# Patient Record
Sex: FEMALE | Race: WHITE | NOT HISPANIC OR LATINO
[De-identification: names, ages, dates, MRNs, and addresses within clinical notes are randomized per-mention and may not be internally consistent; named-entity substitution may affect disease eponyms.]

---

## 2021-04-05 PROBLEM — Z00.00 ENCOUNTER FOR PREVENTIVE HEALTH EXAMINATION: Status: ACTIVE | Noted: 2021-04-05

## 2021-04-06 ENCOUNTER — APPOINTMENT (OUTPATIENT)
Dept: BREAST CENTER | Facility: CLINIC | Age: 71
End: 2021-04-06
Payer: MEDICARE

## 2021-04-06 VITALS
HEART RATE: 89 BPM | BODY MASS INDEX: 33.49 KG/M2 | DIASTOLIC BLOOD PRESSURE: 81 MMHG | HEIGHT: 63 IN | WEIGHT: 189 LBS | SYSTOLIC BLOOD PRESSURE: 147 MMHG

## 2021-04-06 DIAGNOSIS — Z87.898 PERSONAL HISTORY OF OTHER SPECIFIED CONDITIONS: ICD-10-CM

## 2021-04-06 DIAGNOSIS — Z86.79 PERSONAL HISTORY OF OTHER DISEASES OF THE CIRCULATORY SYSTEM: ICD-10-CM

## 2021-04-06 DIAGNOSIS — Z80.0 FAMILY HISTORY OF MALIGNANT NEOPLASM OF DIGESTIVE ORGANS: ICD-10-CM

## 2021-04-06 DIAGNOSIS — Z12.31 ENCOUNTER FOR SCREENING MAMMOGRAM FOR MALIGNANT NEOPLASM OF BREAST: ICD-10-CM

## 2021-04-06 DIAGNOSIS — L23.9 ALLERGIC CONTACT DERMATITIS, UNSPECIFIED CAUSE: ICD-10-CM

## 2021-04-06 PROCEDURE — 99204 OFFICE O/P NEW MOD 45 MIN: CPT

## 2021-04-06 PROCEDURE — 99072 ADDL SUPL MATRL&STAF TM PHE: CPT

## 2021-04-06 RX ORDER — OLMESARTAN MEDOXOMIL-HYDROCHLOROTHIAZIDE 25; 40 MG/1; MG/1
40-25 TABLET, FILM COATED ORAL
Refills: 0 | Status: ACTIVE | COMMUNITY

## 2021-04-06 RX ORDER — ROSUVASTATIN CALCIUM 5 MG/1
5 TABLET, FILM COATED ORAL
Refills: 0 | Status: ACTIVE | COMMUNITY

## 2021-04-06 RX ORDER — BRIMONIDINE/DORZOLAMIDE/PF 0.15 %-2 %
0.15-2 DROPS OPHTHALMIC (EYE)
Refills: 0 | Status: ACTIVE | COMMUNITY

## 2021-04-06 RX ORDER — TIMOLOL MALEATE 5 MG/ML
0.5 SOLUTION OPHTHALMIC
Refills: 0 | Status: ACTIVE | COMMUNITY

## 2021-04-06 RX ORDER — BRINZOLAMIDE 10 MG/ML
1 SUSPENSION/ DROPS OPHTHALMIC
Refills: 0 | Status: ACTIVE | COMMUNITY

## 2021-04-06 NOTE — REVIEW OF SYSTEMS
[Recent Weight Gain (___ Lbs)] : recent [unfilled] ~Ulb weight gain [Eyesight Problems] : eyesight problems [Palpitations] : palpitations [Shortness Of Breath] : shortness of breath [Negative] : Heme/Lymph

## 2021-04-06 NOTE — PHYSICAL EXAM
[Normocephalic] : normocephalic [Atraumatic] : atraumatic [Supple] : supple [No Supraclavicular Adenopathy] : no supraclavicular adenopathy [No Cervical Adenopathy] : no cervical adenopathy [No Thyromegaly] : no thyromegaly [Normal Sinus Rhythm] : normal sinus rhythm [Examined in the supine and seated position] : examined in the supine and seated position [No dominant masses] : no dominant masses in right breast  [No dominant masses] : no dominant masses left breast [No Nipple Retraction] : no left nipple retraction [No Nipple Discharge] : no left nipple discharge [No Axillary Lymphadenopathy] : no left axillary lymphadenopathy [No Edema] : no edema [No Rashes] : no rashes [No Ulceration] : no ulceration [de-identified] : No palp abnl's

## 2021-04-06 NOTE — HISTORY OF PRESENT ILLNESS
[FreeTextEntry1] : Pt w/ R RA nodule detected on Scr Mammo (1/19/21)\par Mammo (1/19/21): +fatty, +poss R A nodule > add'l views\par R sx'ic Mammo/R targeted Sono (1/28/21): +4mm echogenic nodule R 12:00 RA poss present unchanged from 2015 > Rad'st rec's R F/U dx'ic Mammo/R targeted Sono (7/21)\par +FH Br Ca (M. FC 60's)\par No prior Breast Surgery, Breast Procedures or Nipple Discharge. \par No FH Ovarian, Pancreatic Cancer or Melanoma. \par \par \par

## 2021-11-02 ENCOUNTER — NON-APPOINTMENT (OUTPATIENT)
Age: 71
End: 2021-11-02

## 2021-11-02 ENCOUNTER — APPOINTMENT (OUTPATIENT)
Dept: BREAST CENTER | Facility: CLINIC | Age: 71
End: 2021-11-02
Payer: MEDICARE

## 2021-11-02 VITALS
SYSTOLIC BLOOD PRESSURE: 159 MMHG | BODY MASS INDEX: 34.91 KG/M2 | HEART RATE: 80 BPM | WEIGHT: 197 LBS | DIASTOLIC BLOOD PRESSURE: 103 MMHG | HEIGHT: 63 IN

## 2021-11-02 DIAGNOSIS — Z86.19 PERSONAL HISTORY OF OTHER INFECTIOUS AND PARASITIC DISEASES: ICD-10-CM

## 2021-11-02 DIAGNOSIS — Z78.9 OTHER SPECIFIED HEALTH STATUS: ICD-10-CM

## 2021-11-02 DIAGNOSIS — Z86.39 PERSONAL HISTORY OF OTHER ENDOCRINE, NUTRITIONAL AND METABOLIC DISEASE: ICD-10-CM

## 2021-11-02 DIAGNOSIS — Z87.19 PERSONAL HISTORY OF OTHER DISEASES OF THE DIGESTIVE SYSTEM: ICD-10-CM

## 2021-11-02 DIAGNOSIS — Z86.010 PERSONAL HISTORY OF COLONIC POLYPS: ICD-10-CM

## 2021-11-02 PROCEDURE — 99214 OFFICE O/P EST MOD 30 MIN: CPT

## 2021-11-02 NOTE — HISTORY OF PRESENT ILLNESS
[FreeTextEntry1] : Pt w/ R RA nodule detected on Scr Mammo (1/19/21)\par Mammo (1/19/21): +fatty, +poss R A nodule > add'l views\par R sx'ic Mammo/R targeted Sono (1/28/21): +4mm echogenic nodule R 12:00 RA poss present unchanged from 2015 > Rad'st rec's R F/U dx'ic Mammo/R targeted Sono (7/21)\par +FH Br Ca (M. FC 60's)\par Got second Moderna ((5/21)(R/LUE)\par No MH/FH changes. Taking Ca/D. ROS reviewed/discussed. Last Bone Densitometry (?)\par No prior Breast Surgery, Breast Procedures or Nipple Discharge. \par No FH Ovarian, Pancreatic Cancer or Melanoma. \par R F/U Mammo/R Sono (7/29/21): fatty, No RA nodule seen, NSF\par \par \par \par

## 2021-11-02 NOTE — PHYSICAL EXAM
[Normocephalic] : normocephalic [Atraumatic] : atraumatic [Supple] : supple [No Supraclavicular Adenopathy] : no supraclavicular adenopathy [No Cervical Adenopathy] : no cervical adenopathy [No Thyromegaly] : no thyromegaly [Normal Sinus Rhythm] : normal sinus rhythm [Examined in the supine and seated position] : examined in the supine and seated position [No dominant masses] : no dominant masses in right breast  [No dominant masses] : no dominant masses left breast [No Nipple Retraction] : no left nipple retraction [No Nipple Discharge] : no left nipple discharge [No Axillary Lymphadenopathy] : no left axillary lymphadenopathy [No Edema] : no edema [No Rashes] : no rashes [No Ulceration] : no ulceration [de-identified] : +prominent R Ax tissue\par NSF

## 2022-11-03 ENCOUNTER — APPOINTMENT (OUTPATIENT)
Dept: BREAST CENTER | Facility: CLINIC | Age: 72
End: 2022-11-03

## 2022-11-03 VITALS
SYSTOLIC BLOOD PRESSURE: 144 MMHG | BODY MASS INDEX: 34.73 KG/M2 | HEIGHT: 63 IN | HEART RATE: 87 BPM | WEIGHT: 196 LBS | DIASTOLIC BLOOD PRESSURE: 101 MMHG

## 2022-11-03 DIAGNOSIS — Z80.3 FAMILY HISTORY OF MALIGNANT NEOPLASM OF BREAST: ICD-10-CM

## 2022-11-03 DIAGNOSIS — N60.19 DIFFUSE CYSTIC MASTOPATHY OF UNSPECIFIED BREAST: ICD-10-CM

## 2022-11-03 DIAGNOSIS — Q83.1 ACCESSORY BREAST: ICD-10-CM

## 2022-11-03 DIAGNOSIS — R92.8 OTHER ABNORMAL AND INCONCLUSIVE FINDINGS ON DIAGNOSTIC IMAGING OF BREAST: ICD-10-CM

## 2022-11-03 DIAGNOSIS — Z87.898 PERSONAL HISTORY OF OTHER SPECIFIED CONDITIONS: ICD-10-CM

## 2022-11-03 PROCEDURE — 99213 OFFICE O/P EST LOW 20 MIN: CPT

## 2022-11-03 RX ORDER — DORZOLAMIDE HYDROCHLORIDE TIMOLOL MALEATE 20; 5 MG/ML; MG/ML
22.3-6.8 SOLUTION/ DROPS OPHTHALMIC
Qty: 10 | Refills: 0 | Status: ACTIVE | COMMUNITY
Start: 2022-10-21

## 2022-11-03 RX ORDER — TRAVOPROST 0.04 MG/ML
0 SOLUTION OPHTHALMIC
Qty: 5 | Refills: 0 | Status: ACTIVE | COMMUNITY
Start: 2021-12-01

## 2022-11-03 NOTE — REVIEW OF SYSTEMS
[Recent Weight Gain (___ Lbs)] : recent [unfilled] ~Ulb weight gain [Shortness Of Breath] : shortness of breath [Negative] : Heme/Lymph

## 2022-11-03 NOTE — PHYSICAL EXAM
[Normocephalic] : normocephalic [Atraumatic] : atraumatic [Supple] : supple [No Supraclavicular Adenopathy] : no supraclavicular adenopathy [No Cervical Adenopathy] : no cervical adenopathy [No Thyromegaly] : no thyromegaly [Normal Sinus Rhythm] : normal sinus rhythm [Examined in the supine and seated position] : examined in the supine and seated position [No dominant masses] : no dominant masses in right breast  [No dominant masses] : no dominant masses left breast [No Nipple Retraction] : no left nipple retraction [No Nipple Discharge] : no left nipple discharge [No Axillary Lymphadenopathy] : no left axillary lymphadenopathy [No Edema] : no edema [No Rashes] : no rashes [No Ulceration] : no ulceration [de-identified] : +prominent Ax Br tissue\par NSF [de-identified] : +prominent Ax Br tissue\par NSF

## 2022-11-03 NOTE — HISTORY OF PRESENT ILLNESS
[FreeTextEntry1] : Pt w/ R RA nodule detected on Scr Mammo (1/19/21)\par Mammo (1/19/21): +fatty, +poss R A nodule > add'l views\par R sx'ic Mammo/R targeted Sono (1/28/21): +4mm echogenic nodule R 12:00 RA poss present unchanged from 2015 > Rad'st rec's R F/U dx'ic Mammo/R targeted Sono (7/21)\par +FH Br Ca (M. FC 60's)\par Got second Moderna ((5/21)(R/LUE)\par No MH/FH changes. NOT taking Ca/D > discussed. ROS reviewed/discussed. Last Bone Densitometry (?)\par No prior Breast Surgery, Breast Procedures or Nipple Discharge. \par No FH Ovarian, Pancreatic Cancer or Melanoma. \par Mammo (7/15/22): fatty, NSF\par R F/U Mammo/R Sono (7/29/21): fatty, No RA nodule seen, NSF\par \par \par \par

## 2023-11-02 ENCOUNTER — APPOINTMENT (OUTPATIENT)
Dept: BREAST CENTER | Facility: CLINIC | Age: 73
End: 2023-11-02

## 2024-02-06 ENCOUNTER — OFFICE (OUTPATIENT)
Dept: URBAN - METROPOLITAN AREA CLINIC 30 | Facility: CLINIC | Age: 74
Setting detail: OPHTHALMOLOGY
End: 2024-02-06
Payer: MEDICARE

## 2024-02-06 ENCOUNTER — RX ONLY (RX ONLY)
Age: 74
End: 2024-02-06

## 2024-02-06 DIAGNOSIS — H40.1133: ICD-10-CM

## 2024-02-06 DIAGNOSIS — H40.1423: ICD-10-CM

## 2024-02-06 DIAGNOSIS — H25.093: ICD-10-CM

## 2024-02-06 DIAGNOSIS — H47.233: ICD-10-CM

## 2024-02-06 DIAGNOSIS — H40.1233: ICD-10-CM

## 2024-02-06 PROCEDURE — 92020 GONIOSCOPY: CPT | Performed by: OPHTHALMOLOGY

## 2024-02-06 PROCEDURE — 92004 COMPRE OPH EXAM NEW PT 1/>: CPT | Performed by: OPHTHALMOLOGY

## 2024-02-06 PROCEDURE — 92250 FUNDUS PHOTOGRAPHY W/I&R: CPT | Performed by: OPHTHALMOLOGY

## 2024-02-06 ASSESSMENT — CONFRONTATIONAL VISUAL FIELD TEST (CVF)
OS_FINDINGS: FULL
OD_FINDINGS: FULL

## 2024-02-06 ASSESSMENT — REFRACTION_AUTOREFRACTION
OD_AXIS: 5
OS_CYLINDER: +1.50
OD_CYLINDER: +1.75
OS_AXIS: 160
OS_SPHERE: -0.25
OD_SPHERE: -0.50

## 2024-02-06 ASSESSMENT — REFRACTION_CURRENTRX
OD_AXIS: 35
OS_OVR_VA: 20/
OD_SPHERE: -1.00
OS_SPHERE: +2.50
OD_OVR_VA: 20/
OD_OVR_VA: 20/
OD_CYLINDER: +0.50
OS_CYLINDER: +1.00
OS_CYLINDER: SPH
OS_SPHERE: -1.50
OD_SPHERE: +2.50
OD_VPRISM_DIRECTION: SV
OS_OVR_VA: 20/
OD_CYLINDER: SPH
OS_AXIS: 175
OS_VPRISM_DIRECTION: SV

## 2024-02-06 ASSESSMENT — REFRACTION_MANIFEST
OD_VA1: 20/30-2
OD_CYLINDER: +1.75
OD_SPHERE: -0.50
OS_VA1: 20/40
OD_AXIS: 5
OS_SPHERE: -0.25
OS_CYLINDER: +1.50
OS_AXIS: 160

## 2024-02-06 ASSESSMENT — SPHEQUIV_DERIVED
OS_SPHEQUIV: 0.5
OS_SPHEQUIV: 0.5
OD_SPHEQUIV: 0.375
OD_SPHEQUIV: 0.375

## 2024-05-08 ENCOUNTER — OFFICE (OUTPATIENT)
Dept: URBAN - METROPOLITAN AREA CLINIC 30 | Facility: CLINIC | Age: 74
Setting detail: OPHTHALMOLOGY
End: 2024-05-08
Payer: MEDICARE

## 2024-05-08 DIAGNOSIS — H25.89: ICD-10-CM

## 2024-05-08 DIAGNOSIS — H40.033: ICD-10-CM

## 2024-05-08 DIAGNOSIS — H40.1113: ICD-10-CM

## 2024-05-08 DIAGNOSIS — H25.13: ICD-10-CM

## 2024-05-08 DIAGNOSIS — H40.1233: ICD-10-CM

## 2024-05-08 DIAGNOSIS — H40.1423: ICD-10-CM

## 2024-05-08 PROCEDURE — 92020 GONIOSCOPY: CPT | Performed by: OPHTHALMOLOGY

## 2024-05-08 PROCEDURE — 76514 ECHO EXAM OF EYE THICKNESS: CPT | Performed by: OPHTHALMOLOGY

## 2024-05-08 PROCEDURE — 92133 CPTRZD OPH DX IMG PST SGM ON: CPT | Performed by: OPHTHALMOLOGY

## 2024-05-08 PROCEDURE — 99214 OFFICE O/P EST MOD 30 MIN: CPT | Performed by: OPHTHALMOLOGY

## 2024-05-08 PROCEDURE — 92083 EXTENDED VISUAL FIELD XM: CPT | Performed by: OPHTHALMOLOGY

## 2024-05-08 ASSESSMENT — CONFRONTATIONAL VISUAL FIELD TEST (CVF)
OD_FINDINGS: FULL
OS_FINDINGS: FULL